# Patient Record
Sex: MALE | Race: AMERICAN INDIAN OR ALASKA NATIVE
[De-identification: names, ages, dates, MRNs, and addresses within clinical notes are randomized per-mention and may not be internally consistent; named-entity substitution may affect disease eponyms.]

---

## 2019-02-21 ENCOUNTER — HOSPITAL ENCOUNTER (EMERGENCY)
Dept: HOSPITAL 5 - ED | Age: 49
Discharge: HOME | End: 2019-02-21
Payer: SELF-PAY

## 2019-02-21 VITALS — SYSTOLIC BLOOD PRESSURE: 165 MMHG | DIASTOLIC BLOOD PRESSURE: 89 MMHG

## 2019-02-21 DIAGNOSIS — R22.33: Primary | ICD-10-CM

## 2019-02-21 DIAGNOSIS — F12.10: ICD-10-CM

## 2019-02-21 LAB
ALBUMIN SERPL-MCNC: 3.8 G/DL (ref 3.9–5)
ALT SERPL-CCNC: 17 UNITS/L (ref 7–56)
BUN SERPL-MCNC: 11 MG/DL (ref 9–20)
BUN/CREAT SERPL: 9 %
CALCIUM SERPL-MCNC: 9.3 MG/DL (ref 8.4–10.2)
HCT VFR BLD CALC: 37.3 % (ref 35.5–45.6)
HEMOLYSIS INDEX: 7
HGB BLD-MCNC: 12.9 GM/DL (ref 11.8–15.2)
MCHC RBC AUTO-ENTMCNC: 35 % (ref 32–34)
MCV RBC AUTO: 97 FL (ref 84–94)
PLATELET # BLD: 210 K/MM3 (ref 140–440)
RBC # BLD AUTO: 3.86 M/MM3 (ref 3.65–5.03)

## 2019-02-21 PROCEDURE — 93005 ELECTROCARDIOGRAM TRACING: CPT

## 2019-02-21 PROCEDURE — 80053 COMPREHEN METABOLIC PANEL: CPT

## 2019-02-21 PROCEDURE — 93010 ELECTROCARDIOGRAM REPORT: CPT

## 2019-02-21 PROCEDURE — 84484 ASSAY OF TROPONIN QUANT: CPT

## 2019-02-21 PROCEDURE — 85027 COMPLETE CBC AUTOMATED: CPT

## 2019-02-21 PROCEDURE — 71046 X-RAY EXAM CHEST 2 VIEWS: CPT

## 2019-02-21 PROCEDURE — 36415 COLL VENOUS BLD VENIPUNCTURE: CPT

## 2019-02-21 PROCEDURE — 83880 ASSAY OF NATRIURETIC PEPTIDE: CPT

## 2019-02-21 NOTE — EMERGENCY DEPARTMENT REPORT
Blank Doc





- Documentation


Documentation: 





This is a 48-year-old male that presents with bilateral hand pain and swelling. 

Denies any injuries.





This initial assessment diagnostic orders/clinical plan/treatment(s) is/are 

subject to change based on patient's health status, clinical progression and re-

assessment by fellow clinical providers in the ED.  Further treatment and workup

at subsequent clinical providers discretion.  Patient/guardians urged not to 

elope from ED s their condition may be serious if not clinically assessed and 

managed.  Initial orders include:


1-Patient sent to [facility] for further evaluation and treatment

## 2019-02-21 NOTE — XRAY REPORT
ROUTINE CHEST, TWO VIEWS:



HISTORY:  Short of breath.



The trachea, heart, mediastinal contour, lung fields and bony thorax 

are unremarkable.



IMPRESSION:

Unremarkable chest x-ray.

## 2019-02-21 NOTE — EMERGENCY DEPARTMENT REPORT
ED General Adult HPI





- General


Chief complaint: Extremity Injury, Upper


Stated complaint: SWOLLEN HANDS


Time Seen by Provider: 02/21/19 11:40


Source: patient


Mode of arrival: Ambulatory


Limitations: No Limitations





- History of Present Illness


Initial comments: 





Patient is a 48-year-old -American female who comes to the emergency room

with complaints of bilateral hand and feet swelling.  He states that first his 

feet were swollen and he thought it was just from overworking but then his hand 

started to swell.  He has not seen a doctor in many years.  He does admit to 

alcohol and tobacco use.  He is on no home medications.  He does not know his 

father's health.  His mother is alive with diabetes.  Patient endorses some 

shortness of breath but denies any significant chest pain.





PMH


NONE





RX


NONE





PCP


NONE


-: Gradual, days(s)


Severity scale (0 -10): 7


Consistency: intermittent


Improves with: immobilization


Worsens with: other (WORK)


Associated Symptoms: shortness of breath


Treatments Prior to Arrival: none





- Related Data


                                    Allergies











Allergy/AdvReac Type Severity Reaction Status Date / Time


 


No Known Allergies Allergy   Unverified 02/21/19 11:07














ED Review of Systems


ROS: 


Stated complaint: SWOLLEN HANDS


Other details as noted in HPI





Comment: All other systems reviewed and negative


Constitutional: denies: chills


Eyes: denies: eye pain


ENT: denies: throat pain


Respiratory: see HPI, shortness of breath.  denies: cough


Cardiovascular: as per HPI, edema.  denies: chest pain


Endocrine: denies: excessive sweating


Gastrointestinal: denies: abdominal pain


Genitourinary: denies: urgency


Musculoskeletal: denies: back pain


Skin: denies: rash


Neurological: denies: headache


Psychiatric: denies: depression


Hematological/Lymphatic: denies: as per HPI





ED Past Medical Hx





- Past Medical History


Previous Medical History?: No





- Surgical History


Past Surgical History?: No





- Family History


Family history: other (MOM ALIVE W DM; DAD UNKNOWN)





- Social History


Smoking Status: Never Smoker


Substance Use Type: Marijuana





ED Physical Exam





- General


Limitations: No Limitations





ED Course


                                   Vital Signs











  02/21/19





  11:40


 


Temperature 98.6 F


 


Pulse Rate 58 L


 


Respiratory 16





Rate 


 


Blood Pressure 165/89


 


O2 Sat by Pulse 99





Oximetry 














ED Medical Decision Making





- Lab Data


Result diagrams: 


                                 02/21/19 13:14





                                 02/21/19 13:14





- EKG Data


-: EKG Interpreted by Me


EKG shows normal: sinus rhythm


Rate: normal





- EKG Data


When compared to previous EKG there are: no significant change


Interpretation: no acute changes





- Medical Decision Making





XRAY NEG





12 LEAD NAP





LABS NOTED





WILL DC HOME WITH REFERRAL TO PCP FOR FOLLOW UP





Labs











  02/21/19 02/21/19





  13:14 13:14


 


WBC  5.8 


 


RBC  3.86 


 


Hgb  12.9 


 


Hct  37.3 


 


MCV  97 H 


 


MCH  34 H 


 


MCHC  35 H 


 


RDW  13.8 


 


Plt Count  210 


 


Sodium   143


 


Potassium   4.0


 


Chloride   104.8


 


Carbon Dioxide   30


 


Anion Gap   12


 


BUN   11


 


Creatinine   1.2


 


Estimated GFR   > 60


 


BUN/Creatinine Ratio   9


 


Glucose   95


 


Calcium   9.3


 


Total Bilirubin   0.20


 


AST   16


 


ALT   17


 


Alkaline Phosphatase   47


 


Troponin T   < 0.010


 


NT-Pro-B Natriuret Pep   18.29


 


Total Protein   6.7


 


Albumin   3.8 L


 


Albumin/Globulin Ratio   1.3














- Differential Diagnosis


RO CHF


Critical care attestation.: 


If time is entered above; I have spent that time in minutes in the direct care 

of this critically ill patient, excluding procedure time.








ED Disposition


Clinical Impression: 


 Hand swelling





Disposition: DC-01 TO HOME OR SELFCARE


Is pt being admited?: No


Does the pt Need Aspirin: No


Condition: Stable


Instructions:  Leg Edema (ED)


Additional Instructions: 


hydyrate well with water





avoid salt and processed foods





avoid alcohol





labs all normal today


referral to primary care below








Referrals: 


[facility] [place] [Primary Care Provider] - 3-5 Days


Time of Disposition: 13:56